# Patient Record
Sex: MALE | URBAN - METROPOLITAN AREA
[De-identification: names, ages, dates, MRNs, and addresses within clinical notes are randomized per-mention and may not be internally consistent; named-entity substitution may affect disease eponyms.]

---

## 2021-01-13 ENCOUNTER — OFFICE VISIT (OUTPATIENT)
Dept: INTERNAL MEDICINE CLINIC | Age: 36
End: 2021-01-13
Payer: COMMERCIAL

## 2021-01-13 VITALS
OXYGEN SATURATION: 99 % | RESPIRATION RATE: 16 BRPM | TEMPERATURE: 98.3 F | HEIGHT: 68 IN | DIASTOLIC BLOOD PRESSURE: 82 MMHG | WEIGHT: 225.4 LBS | SYSTOLIC BLOOD PRESSURE: 129 MMHG | BODY MASS INDEX: 34.16 KG/M2 | HEART RATE: 62 BPM

## 2021-01-13 DIAGNOSIS — Z00.00 ROUTINE GENERAL MEDICAL EXAMINATION AT A HEALTH CARE FACILITY: Primary | ICD-10-CM

## 2021-01-13 DIAGNOSIS — Z23 NEEDS FLU SHOT: ICD-10-CM

## 2021-01-13 PROCEDURE — 99385 PREV VISIT NEW AGE 18-39: CPT | Performed by: INTERNAL MEDICINE

## 2021-01-13 PROCEDURE — 90471 IMMUNIZATION ADMIN: CPT | Performed by: INTERNAL MEDICINE

## 2021-01-13 PROCEDURE — 90686 IIV4 VACC NO PRSV 0.5 ML IM: CPT | Performed by: INTERNAL MEDICINE

## 2021-01-13 NOTE — PROGRESS NOTES
Ramon Duncan (: 1985) is a 38 y.o. male, new patient, here for evaluation of the following chief complaint(s):  Establish Care       ASSESSMENT/PLAN:  1. Routine general medical examination at a health care facility  -     CBC W/O DIFF; Future  -     METABOLIC PANEL, COMPREHENSIVE; Future  -     LIPID PANEL; Future  -     TSH 3RD GENERATION; Future    2. Needs flu shot  -     INFLUENZA VIRUS VAC QUAD,SPLIT,PRESV FREE SYRINGE IM        No follow-ups on file. SUBJECTIVE/OBJECTIVE:  HPI  He only takes an allergy pill but no other meds  He does lift weights and cardio- treadmill and goes to [employer] - has  been two days a week   Eats well balanced food and grilled and baked    he is not having any issues with urination; he does drink water ; he has occasional diet soda   Review of Systems   All other systems reviewed and are negative. Physical Exam  Vitals signs and nursing note reviewed. Constitutional:       Appearance: He is well-developed. HENT:      Head: Normocephalic and atraumatic. Right Ear: External ear normal.      Left Ear: External ear normal.      Nose: Nose normal.   Eyes:      Conjunctiva/sclera: Conjunctivae normal.      Pupils: Pupils are equal, round, and reactive to light. Neck:      Musculoskeletal: Normal range of motion and neck supple. Thyroid: No thyroid mass or thyromegaly. Vascular: No carotid bruit. Cardiovascular:      Rate and Rhythm: Normal rate and regular rhythm. Heart sounds: Normal heart sounds. Pulmonary:      Effort: Pulmonary effort is normal.      Breath sounds: Normal breath sounds. Abdominal:      General: Bowel sounds are normal.      Palpations: Abdomen is soft. Musculoskeletal: Normal range of motion. Skin:     General: Skin is warm and dry. Neurological:      Mental Status: He is alert and oriented to person, place, and time. Cranial Nerves: No cranial nerve deficit. Sensory: No sensory deficit. Psychiatric:         Behavior: Behavior normal.         Thought Content: Thought content normal.         Judgment: Judgment normal.               An electronic signature was used to authenticate this note.   -- Leona Lindsey MD

## 2021-01-14 LAB
ALBUMIN SERPL-MCNC: 4.5 G/DL (ref 3.5–5)
ALBUMIN/GLOB SERPL: 1.6 {RATIO} (ref 1.1–2.2)
ALP SERPL-CCNC: 49 U/L (ref 45–117)
ALT SERPL-CCNC: 28 U/L (ref 12–78)
ANION GAP SERPL CALC-SCNC: 1 MMOL/L (ref 5–15)
AST SERPL-CCNC: 27 U/L (ref 15–37)
BILIRUB SERPL-MCNC: 0.8 MG/DL (ref 0.2–1)
BUN SERPL-MCNC: 17 MG/DL (ref 6–20)
BUN/CREAT SERPL: 14 (ref 12–20)
CALCIUM SERPL-MCNC: 9.8 MG/DL (ref 8.5–10.1)
CHLORIDE SERPL-SCNC: 104 MMOL/L (ref 97–108)
CHOLEST SERPL-MCNC: 225 MG/DL
CO2 SERPL-SCNC: 32 MMOL/L (ref 21–32)
CREAT SERPL-MCNC: 1.2 MG/DL (ref 0.7–1.3)
ERYTHROCYTE [DISTWIDTH] IN BLOOD BY AUTOMATED COUNT: 13.5 % (ref 11.5–14.5)
GLOBULIN SER CALC-MCNC: 2.8 G/DL (ref 2–4)
GLUCOSE SERPL-MCNC: 80 MG/DL (ref 65–100)
HCT VFR BLD AUTO: 47.6 % (ref 36.6–50.3)
HDLC SERPL-MCNC: 72 MG/DL
HDLC SERPL: 3.1 {RATIO} (ref 0–5)
HGB BLD-MCNC: 15.7 G/DL (ref 12.1–17)
LDLC SERPL CALC-MCNC: 146.6 MG/DL (ref 0–100)
LIPID PROFILE,FLP: ABNORMAL
MCH RBC QN AUTO: 29.2 PG (ref 26–34)
MCHC RBC AUTO-ENTMCNC: 33 G/DL (ref 30–36.5)
MCV RBC AUTO: 88.5 FL (ref 80–99)
NRBC # BLD: 0 K/UL (ref 0–0.01)
NRBC BLD-RTO: 0 PER 100 WBC
PLATELET # BLD AUTO: 142 K/UL (ref 150–400)
PMV BLD AUTO: 11.1 FL (ref 8.9–12.9)
POTASSIUM SERPL-SCNC: 4.6 MMOL/L (ref 3.5–5.1)
PROT SERPL-MCNC: 7.3 G/DL (ref 6.4–8.2)
RBC # BLD AUTO: 5.38 M/UL (ref 4.1–5.7)
SODIUM SERPL-SCNC: 137 MMOL/L (ref 136–145)
TRIGL SERPL-MCNC: 32 MG/DL (ref ?–150)
TSH SERPL DL<=0.05 MIU/L-ACNC: 0.85 UIU/ML (ref 0.36–3.74)
VLDLC SERPL CALC-MCNC: 6.4 MG/DL
WBC # BLD AUTO: 5.9 K/UL (ref 4.1–11.1)

## 2021-01-18 NOTE — PROGRESS NOTES
Please let him know his cholesterol is a little borderline. . not something I would treat as the HDL was good but something we need to watch- can we recheck again in 6 months?  Go to [employer] etc

## 2021-01-19 ENCOUNTER — TELEPHONE (OUTPATIENT)
Dept: INTERNAL MEDICINE CLINIC | Age: 36
End: 2021-01-19

## 2021-01-19 NOTE — TELEPHONE ENCOUNTER
Spoke with patient and relayed lab results and Dr. Jadiel Mills instructions. Pt understood and was thankful for the call, he will contact the office to schedule an appt to f/u after checking his schedule.

## 2021-01-19 NOTE — TELEPHONE ENCOUNTER
----- Message from Missy Caballero MD sent at 1/18/2021  4:06 PM EST -----  Please let him know his cholesterol is a little borderline. . not something I would treat as the HDL was good but something we need to watch- can we recheck again in 6 months?  Go to [employer] etc

## 2023-04-17 ENCOUNTER — OFFICE VISIT (OUTPATIENT)
Dept: INTERNAL MEDICINE CLINIC | Age: 38
End: 2023-04-17
Payer: COMMERCIAL

## 2023-04-17 VITALS
OXYGEN SATURATION: 98 % | HEIGHT: 68 IN | BODY MASS INDEX: 33.77 KG/M2 | DIASTOLIC BLOOD PRESSURE: 90 MMHG | TEMPERATURE: 97.8 F | WEIGHT: 222.8 LBS | RESPIRATION RATE: 16 BRPM | HEART RATE: 58 BPM | SYSTOLIC BLOOD PRESSURE: 124 MMHG

## 2023-04-17 DIAGNOSIS — Z82.49 FH: HTN (HYPERTENSION): ICD-10-CM

## 2023-04-17 DIAGNOSIS — Z23 ENCOUNTER FOR IMMUNIZATION: ICD-10-CM

## 2023-04-17 DIAGNOSIS — R03.0 ELEVATED BP WITHOUT DIAGNOSIS OF HYPERTENSION: ICD-10-CM

## 2023-04-17 DIAGNOSIS — Z00.00 WELL ADULT EXAM: Primary | ICD-10-CM

## 2023-04-17 PROCEDURE — 99395 PREV VISIT EST AGE 18-39: CPT | Performed by: INTERNAL MEDICINE

## 2023-04-17 PROCEDURE — 90715 TDAP VACCINE 7 YRS/> IM: CPT | Performed by: INTERNAL MEDICINE

## 2023-04-17 PROCEDURE — 90471 IMMUNIZATION ADMIN: CPT | Performed by: INTERNAL MEDICINE

## 2023-04-17 RX ORDER — CETIRIZINE HYDROCHLORIDE 10 MG/1
10 TABLET ORAL DAILY
COMMUNITY

## 2023-04-17 NOTE — PROGRESS NOTES
Subjective:   Alla Piña is a 42 y.o. male presenting for his annual checkup. ROS:  Feeling well. No dyspnea or chest pain on exertion. No abdominal pain, change in bowel habits, black or bloody stools. No urinary tract or prostatic symptoms. No neurological complaints. Specific concerns today: he uses a muscle building shake with stimulant prior to weight lifting and also had  ham yesterday  Wonders if this is pushing  his bp up  Does have fh htn  No headaches or dizzy spells  Works swing shift as a  at [employer] sometimes 7pm-7 am  3 children oldest is 15   wife is a dental  hygienist  No tobacco and minimal alcohol  Dad prostate cancer diagnosis in his 46s  . Patient Active Problem List    Diagnosis Date Noted    FH: HTN (hypertension) 04/17/2023     Current Outpatient Medications   Medication Sig Dispense Refill    fluticasone propionate (FLONASE NA) 1 Inhalation by Nasal route daily. cetirizine (ZYRTEC) 10 mg tablet Take 1 Tablet by mouth daily. No Known Allergies  History reviewed. No pertinent past medical history. History reviewed. No pertinent surgical history. Family History   Problem Relation Age of Onset    Hypertension Mother     High Cholesterol Mother     Diabetes Mother         type 2    Cancer Father         Prostate    High Cholesterol Father     Hypertension Father     Dementia Maternal Aunt     COPD Maternal Grandfather      Social History     Tobacco Use    Smoking status: Never    Smokeless tobacco: Never   Substance Use Topics    Alcohol use: Yes     Alcohol/week: 2.0 - 3.0 standard drinks     Types: 2 - 3 Cans of beer per week             Objective:     Visit Vitals  BP (!) 124/90   Pulse (!) 58   Temp 97.8 °F (36.6 °C) (Oral)   Resp 16   Ht 5' 7.5\" (1.715 m)   Wt 222 lb 12.8 oz (101.1 kg)   SpO2 98%   BMI 34.38 kg/m²     The patient appears well, alert, oriented x 3, in no distress. ENT normal.  Neck supple. No adenopathy or thyromegaly. XOCHITL. Lungs are clear, good air entry, no wheezes, rhonchi or rales. S1 and S2 normal, no murmurs, regular rate and rhythm. Abdomen is soft without tenderness, guarding, mass or organomegaly.  exam: no penile lesions or discharge, no testicular masses or tenderness, no hernias. Extremities show no edema, normal peripheral pulses. Neurological is normal without focal findings. Assessment/Plan:   healthy adult male  follow low salt diet. Diagnoses and all orders for this visit:    1. Well adult exam  -     URINALYSIS W/ RFLX MICROSCOPIC; Future  -     HEPATITIS C AB; Future  -     CBC WITH AUTOMATED DIFF; Future  -     TSH 3RD GENERATION; Future  -     LIPID PANEL; Future  -     METABOLIC PANEL, COMPREHENSIVE; Future    2. Elevated BP without diagnosis of hypertension  Low threshold to start bp med  Will try lifestyle modification including limit supplements with stimulants and limit salt and appt in 3 months  3. FH: HTN (hypertension)    4. Encounter for immunization  -     TDAP, [address], (AGE 10 YRS+), IM    .

## 2023-04-17 NOTE — PATIENT INSTRUCTIONS
Please read about a dash diet for blood pressure  Please see me in 3 months for a blood pressure check

## 2024-04-08 ENCOUNTER — OFFICE VISIT (OUTPATIENT)
Age: 39
End: 2024-04-08
Payer: COMMERCIAL

## 2024-04-08 VITALS
HEIGHT: 68 IN | WEIGHT: 211.3 LBS | DIASTOLIC BLOOD PRESSURE: 80 MMHG | OXYGEN SATURATION: 96 % | BODY MASS INDEX: 32.02 KG/M2 | TEMPERATURE: 98.2 F | SYSTOLIC BLOOD PRESSURE: 124 MMHG | RESPIRATION RATE: 16 BRPM | HEART RATE: 77 BPM

## 2024-04-08 DIAGNOSIS — J02.9 SORE THROAT: ICD-10-CM

## 2024-04-08 DIAGNOSIS — J06.9 UPPER RESPIRATORY TRACT INFECTION, UNSPECIFIED TYPE: Primary | ICD-10-CM

## 2024-04-08 LAB
GROUP A STREP ANTIGEN, POC: NEGATIVE
VALID INTERNAL CONTROL, POC: YES

## 2024-04-08 PROCEDURE — 99213 OFFICE O/P EST LOW 20 MIN: CPT | Performed by: INTERNAL MEDICINE

## 2024-04-08 PROCEDURE — 87880 STREP A ASSAY W/OPTIC: CPT | Performed by: INTERNAL MEDICINE

## 2024-04-08 RX ORDER — FLUTICASONE PROPIONATE 50 MCG
1 SPRAY, SUSPENSION (ML) NASAL DAILY
COMMUNITY

## 2024-04-08 RX ORDER — CETIRIZINE HYDROCHLORIDE 10 MG/1
10 TABLET ORAL DAILY
COMMUNITY

## 2024-04-08 SDOH — ECONOMIC STABILITY: FOOD INSECURITY: WITHIN THE PAST 12 MONTHS, THE FOOD YOU BOUGHT JUST DIDN'T LAST AND YOU DIDN'T HAVE MONEY TO GET MORE.: NEVER TRUE

## 2024-04-08 SDOH — ECONOMIC STABILITY: HOUSING INSECURITY
IN THE LAST 12 MONTHS, WAS THERE A TIME WHEN YOU DID NOT HAVE A STEADY PLACE TO SLEEP OR SLEPT IN A SHELTER (INCLUDING NOW)?: NO

## 2024-04-08 SDOH — ECONOMIC STABILITY: FOOD INSECURITY: WITHIN THE PAST 12 MONTHS, YOU WORRIED THAT YOUR FOOD WOULD RUN OUT BEFORE YOU GOT MONEY TO BUY MORE.: NEVER TRUE

## 2024-04-08 SDOH — ECONOMIC STABILITY: INCOME INSECURITY: HOW HARD IS IT FOR YOU TO PAY FOR THE VERY BASICS LIKE FOOD, HOUSING, MEDICAL CARE, AND HEATING?: NOT HARD AT ALL

## 2024-04-08 ASSESSMENT — PATIENT HEALTH QUESTIONNAIRE - PHQ9
1. LITTLE INTEREST OR PLEASURE IN DOING THINGS: NOT AT ALL
SUM OF ALL RESPONSES TO PHQ QUESTIONS 1-9: 0
SUM OF ALL RESPONSES TO PHQ QUESTIONS 1-9: 0
2. FEELING DOWN, DEPRESSED OR HOPELESS: NOT AT ALL
SUM OF ALL RESPONSES TO PHQ9 QUESTIONS 1 & 2: 0
SUM OF ALL RESPONSES TO PHQ QUESTIONS 1-9: 0
SUM OF ALL RESPONSES TO PHQ QUESTIONS 1-9: 0

## 2024-04-08 NOTE — PROGRESS NOTES
Ravi Mcnulty Jr. (:  1985) is a 38 y.o. male,Established patient, here for evaluation of the following chief complaint(s):  Pharyngitis (Per patient SX started 4/3; SX drainage, sore throat)         ASSESSMENT/PLAN:  1. Upper respiratory tract infection, unspecified type  2. Sore throat  -     AMB POC RAPID STREP A    Viral uri last week with some dehydration suspected   Today exam is normal and neg strep  Note provided ok to return to work  No follow-ups on file.         Subjective   SUBJECTIVE/OBJECTIVE:  HPI  Last week on vacation in [place] he had URI symptoms including sore throat some cough and drainage 1 episode of eye redness with some discharge all of this is much improved wants to be sure it is safe to go back to work at [employer] currently no fevers chills or shortness of breath strep swab is negative  Review of Systems       Objective   Physical Exam  Constitutional:       Appearance: Normal appearance.   HENT:      Head: Normocephalic and atraumatic.      Right Ear: Tympanic membrane and ear canal normal.      Left Ear: Tympanic membrane and ear canal normal.      Mouth/Throat:      Mouth: Mucous membranes are moist.   Eyes:      Pupils: Pupils are equal, round, and reactive to light.   Cardiovascular:      Rate and Rhythm: Normal rate and regular rhythm.   Pulmonary:      Effort: Pulmonary effort is normal.      Breath sounds: Normal breath sounds. No wheezing or rhonchi.   Musculoskeletal:      Right lower leg: No edema.      Left lower leg: No edema.   Lymphadenopathy:      Cervical: No cervical adenopathy.   Neurological:      General: No focal deficit present.      Mental Status: He is alert and oriented to person, place, and time.   Psychiatric:         Behavior: Behavior normal.                  An electronic signature was used to authenticate this note.    --Judith Romeo MD

## 2024-04-09 RX ORDER — CEPHALEXIN 500 MG/1
500 CAPSULE ORAL 2 TIMES DAILY
Qty: 14 CAPSULE | Refills: 0 | Status: CANCELLED | OUTPATIENT
Start: 2024-04-09 | End: 2024-04-16

## 2025-02-04 ENCOUNTER — OFFICE VISIT (OUTPATIENT)
Facility: CLINIC | Age: 40
End: 2025-02-04
Payer: COMMERCIAL

## 2025-02-04 VITALS
TEMPERATURE: 99.5 F | HEART RATE: 81 BPM | WEIGHT: 217 LBS | DIASTOLIC BLOOD PRESSURE: 82 MMHG | OXYGEN SATURATION: 97 % | SYSTOLIC BLOOD PRESSURE: 120 MMHG | HEIGHT: 68 IN | RESPIRATION RATE: 16 BRPM | BODY MASS INDEX: 32.89 KG/M2

## 2025-02-04 DIAGNOSIS — U07.1 COVID-19: Primary | ICD-10-CM

## 2025-02-04 LAB
INFLUENZA A ANTIGEN, POC: NEGATIVE
INFLUENZA B ANTIGEN, POC: NEGATIVE
LOT EXPIRE DATE: ABNORMAL
LOT KIT NUMBER: ABNORMAL
SARS-COV-2, POC: DETECTED
VALID INTERNAL CONTROL, POC: YES
VALID INTERNAL CONTROL: YES
VENDOR AND KIT NAME POC: ABNORMAL

## 2025-02-04 PROCEDURE — 87804 INFLUENZA ASSAY W/OPTIC: CPT | Performed by: NURSE PRACTITIONER

## 2025-02-04 PROCEDURE — 87426 SARSCOV CORONAVIRUS AG IA: CPT | Performed by: NURSE PRACTITIONER

## 2025-02-04 PROCEDURE — 99213 OFFICE O/P EST LOW 20 MIN: CPT | Performed by: NURSE PRACTITIONER

## 2025-02-04 ASSESSMENT — ENCOUNTER SYMPTOMS
DIARRHEA: 0
NAUSEA: 0
BACK PAIN: 0
SINUS PAIN: 0
SORE THROAT: 1
SINUS PRESSURE: 0
CONSTIPATION: 0
VOMITING: 0
RHINORRHEA: 0
EYES NEGATIVE: 1
GASTROINTESTINAL NEGATIVE: 1
SHORTNESS OF BREATH: 0
ABDOMINAL PAIN: 0
BLOOD IN STOOL: 0
CHEST TIGHTNESS: 0
EYE REDNESS: 0
EYE PAIN: 0
COUGH: 1

## 2025-02-04 NOTE — PROGRESS NOTES
Assessment and Plan     1. COVID-19: Positive COVID-19 in office, negative for influenza. Continue with Flonase nasal spray and PO antihistamine. Can take Tylenol as needed for pain and fever, up to 3 grams daily. Infection prevention, hydration recommended. Return instructions given. Pt verbalized understanding.   -     AMB POC SARS-COV-2  -     AMB POC KATELYN INFLUENZA A/B TEST     Tests Preformed During Visit:    Results for orders placed or performed in visit on 25   AMB POC SARS-COV-2   Result Value Ref Range    SARS-COV-2, POC Detected (A) Not Detected    Internal Control YES     Lot/Kit Number [id number]     Lot/Kit  date: 2025     Vendor and kit name [employer] Katelyn    AMB POC KATELYN INFLUENZA A/B TEST   Result Value Ref Range    Valid Internal Control, POC YES     Influenza A Antigen, POC Negative Not Detected    Influenza B Antigen, POC Negative Not Detected      Benefits, risks, possible drug interactions, and side effects of all new medications were reviewed with the patient. Pt verbalized understanding.    An electronic signature was used to authenticate this note.  Emily Villarreal, AMRIT - CNP  2025      Follow-up and Dispositions    Return if symptoms worsen or fail to improve.          History of Present Illness   Chief Complaint     Ravi Mcnulty Jr. is a 39 y.o. male here for had concerns including Cold Symptoms.    Mr. Mcnulty presents today with reports of productive cough, headaches, fatigue, sore throat for the past 3 months. PMH includes environmental allergies. Treats symptoms with flonase nasal spray and PO antihistamines  Tested negative for COVID-19 at home. Son had similar symptoms recently. Denies fever, chills, shortness of breath.     Review of Systems  Review of Systems   Constitutional: Negative.  Negative for chills, fatigue, fever and unexpected weight change.   HENT:  Positive for sore throat. Negative for congestion, ear discharge, ear pain, rhinorrhea, sinus

## 2025-02-19 ENCOUNTER — TELEPHONE (OUTPATIENT)
Facility: CLINIC | Age: 40
End: 2025-02-19

## 2025-02-19 ENCOUNTER — PATIENT MESSAGE (OUTPATIENT)
Facility: CLINIC | Age: 40
End: 2025-02-19

## 2025-02-19 NOTE — TELEPHONE ENCOUNTER
The patient is requesting a call back to discuss additional paperwork that need to be filled out due to office visit from 02/04/2025. He stated that he was given a doctor note for Covid but his job is requesting for a form to be filled out before he return back to work. He will like to be advised on if he can drop off the paperwork or email. [phone].

## 2025-08-14 ENCOUNTER — OFFICE VISIT (OUTPATIENT)
Facility: CLINIC | Age: 40
End: 2025-08-14
Payer: COMMERCIAL

## 2025-08-14 VITALS
DIASTOLIC BLOOD PRESSURE: 80 MMHG | RESPIRATION RATE: 16 BRPM | BODY MASS INDEX: 34.06 KG/M2 | WEIGHT: 217 LBS | SYSTOLIC BLOOD PRESSURE: 124 MMHG | HEART RATE: 80 BPM | TEMPERATURE: 97.9 F | HEIGHT: 67 IN | OXYGEN SATURATION: 97 %

## 2025-08-14 DIAGNOSIS — Z83.2 FH: SICKLE CELL ANEMIA: ICD-10-CM

## 2025-08-14 DIAGNOSIS — Z82.49 FH: HTN (HYPERTENSION): ICD-10-CM

## 2025-08-14 DIAGNOSIS — Z00.00 WELL ADULT EXAM: Primary | ICD-10-CM

## 2025-08-14 DIAGNOSIS — J30.2 SEASONAL ALLERGIES: ICD-10-CM

## 2025-08-14 DIAGNOSIS — Z80.42 FH: PROSTATE CANCER: ICD-10-CM

## 2025-08-14 DIAGNOSIS — Z00.00 WELL ADULT EXAM: ICD-10-CM

## 2025-08-14 PROCEDURE — 99396 PREV VISIT EST AGE 40-64: CPT | Performed by: INTERNAL MEDICINE

## 2025-08-14 RX ORDER — FLUTICASONE PROPIONATE 50 MCG
2 SPRAY, SUSPENSION (ML) NASAL DAILY
Qty: 16 G | Refills: 5 | Status: SHIPPED | OUTPATIENT
Start: 2025-08-14

## 2025-08-14 SDOH — ECONOMIC STABILITY: FOOD INSECURITY: WITHIN THE PAST 12 MONTHS, YOU WORRIED THAT YOUR FOOD WOULD RUN OUT BEFORE YOU GOT MONEY TO BUY MORE.: NEVER TRUE

## 2025-08-14 SDOH — ECONOMIC STABILITY: FOOD INSECURITY: WITHIN THE PAST 12 MONTHS, THE FOOD YOU BOUGHT JUST DIDN'T LAST AND YOU DIDN'T HAVE MONEY TO GET MORE.: NEVER TRUE

## 2025-08-14 ASSESSMENT — PATIENT HEALTH QUESTIONNAIRE - PHQ9
SUM OF ALL RESPONSES TO PHQ QUESTIONS 1-9: 0
1. LITTLE INTEREST OR PLEASURE IN DOING THINGS: NOT AT ALL
SUM OF ALL RESPONSES TO PHQ QUESTIONS 1-9: 0
2. FEELING DOWN, DEPRESSED OR HOPELESS: NOT AT ALL

## 2025-08-15 ENCOUNTER — RESULTS FOLLOW-UP (OUTPATIENT)
Facility: CLINIC | Age: 40
End: 2025-08-15

## 2025-08-15 LAB
ALBUMIN SERPL-MCNC: 4.4 G/DL (ref 3.5–5.2)
ALBUMIN/GLOB SERPL: 1.6 (ref 1.1–2.2)
ALP SERPL-CCNC: 44 U/L (ref 40–129)
ALT SERPL-CCNC: 19 U/L (ref 10–50)
ANION GAP SERPL CALC-SCNC: 10 MMOL/L (ref 2–14)
AST SERPL-CCNC: 28 U/L (ref 10–50)
BILIRUB SERPL-MCNC: 0.7 MG/DL (ref 0–1.2)
BUN SERPL-MCNC: 16 MG/DL (ref 6–20)
BUN/CREAT SERPL: 13 (ref 12–20)
CALCIUM SERPL-MCNC: 9.7 MG/DL (ref 8.6–10)
CHLORIDE SERPL-SCNC: 101 MMOL/L (ref 98–107)
CHOLEST SERPL-MCNC: 248 MG/DL (ref 0–200)
CO2 SERPL-SCNC: 28 MMOL/L (ref 20–29)
COMMENT:: NORMAL
CREAT SERPL-MCNC: 1.24 MG/DL (ref 0.7–1.2)
ERYTHROCYTE [DISTWIDTH] IN BLOOD BY AUTOMATED COUNT: 13.2 % (ref 11.5–14.5)
GLOBULIN SER CALC-MCNC: 2.7 G/DL (ref 2–4)
GLUCOSE SERPL-MCNC: 81 MG/DL (ref 65–100)
HCT VFR BLD AUTO: 46.3 % (ref 36.6–50.3)
HDLC SERPL-MCNC: 65 MG/DL (ref 40–60)
HDLC SERPL: 3.8 (ref 0–5)
HGB BLD-MCNC: 15.2 G/DL (ref 12.1–17)
LDLC SERPL CALC-MCNC: 174 MG/DL
MCH RBC QN AUTO: 29.1 PG (ref 26–34)
MCHC RBC AUTO-ENTMCNC: 32.8 G/DL (ref 30–36.5)
MCV RBC AUTO: 88.5 FL (ref 80–99)
NRBC # BLD: 0 K/UL (ref 0–0.01)
NRBC BLD-RTO: 0 PER 100 WBC
PLATELET # BLD AUTO: NORMAL K/UL (ref 150–400)
POTASSIUM SERPL-SCNC: 4.4 MMOL/L (ref 3.5–5.1)
PROT SERPL-MCNC: 7 G/DL (ref 6.4–8.3)
PSA SERPL-MCNC: 0.6 NG/ML (ref 0–2)
RBC # BLD AUTO: 5.23 M/UL (ref 4.1–5.7)
SODIUM SERPL-SCNC: 139 MMOL/L (ref 136–145)
SPECIMEN HOLD: NORMAL
TRIGL SERPL-MCNC: 49 MG/DL (ref 0–150)
VLDLC SERPL CALC-MCNC: 10 MG/DL
WBC # BLD AUTO: 5.8 K/UL (ref 4.1–11.1)

## 2025-08-17 LAB
HGB A MFR BLD: 97.2 % (ref 96.4–98.8)
HGB A2 MFR BLD COLUMN CHROM: 2.8 % (ref 1.8–3.2)
HGB F MFR BLD: 0 % (ref 0–2)
HGB FRACT BLD-IMP: NORMAL
HGB S MFR BLD: 0 %